# Patient Record
Sex: MALE | Race: WHITE | NOT HISPANIC OR LATINO
[De-identification: names, ages, dates, MRNs, and addresses within clinical notes are randomized per-mention and may not be internally consistent; named-entity substitution may affect disease eponyms.]

---

## 2018-02-18 ENCOUNTER — CHARTING TRANS (OUTPATIENT)
Dept: OTHER | Age: 63
End: 2018-02-18

## 2018-02-18 ENCOUNTER — LAB SERVICES (OUTPATIENT)
Dept: OTHER | Age: 63
End: 2018-02-18

## 2018-02-18 LAB — RAPID STREP GROUP A: NORMAL

## 2018-02-21 LAB — CULTURE STREP GRP A (STTH) HL: NORMAL

## 2018-11-01 VITALS — HEIGHT: 78 IN | BODY MASS INDEX: 27.19 KG/M2 | TEMPERATURE: 99.2 F | OXYGEN SATURATION: 98 % | WEIGHT: 235 LBS

## 2021-08-15 ENCOUNTER — APPOINTMENT (OUTPATIENT)
Dept: ULTRASOUND IMAGING | Age: 66
End: 2021-08-15
Attending: NURSE PRACTITIONER
Payer: COMMERCIAL

## 2021-08-15 ENCOUNTER — HOSPITAL ENCOUNTER (OUTPATIENT)
Age: 66
Discharge: HOME | End: 2021-08-15
Payer: COMMERCIAL

## 2021-08-15 VITALS
SYSTOLIC BLOOD PRESSURE: 120 MMHG | DIASTOLIC BLOOD PRESSURE: 80 MMHG | BODY MASS INDEX: 26.61 KG/M2 | TEMPERATURE: 99 F | HEART RATE: 64 BPM | WEIGHT: 230 LBS | RESPIRATION RATE: 16 BRPM | OXYGEN SATURATION: 98 % | HEIGHT: 78 IN

## 2021-08-15 DIAGNOSIS — M79.89 LEG SWELLING: Primary | ICD-10-CM

## 2021-08-15 PROCEDURE — 99203 OFFICE O/P NEW LOW 30 MIN: CPT | Performed by: NURSE PRACTITIONER

## 2021-08-15 PROCEDURE — 76882 US LMTD JT/FCL EVL NVASC XTR: CPT | Performed by: NURSE PRACTITIONER

## 2021-08-15 RX ORDER — FLUOXETINE HYDROCHLORIDE 40 MG/1
40 CAPSULE ORAL DAILY
COMMUNITY
Start: 2021-04-22

## 2021-08-15 RX ORDER — BUPROPION HYDROCHLORIDE 150 MG/1
150 TABLET ORAL EVERY MORNING
COMMUNITY
Start: 2021-04-22

## 2021-08-15 NOTE — ED PROVIDER NOTES
Patient Seen in: [facility]      History   Patient presents with:  Swelling    Stated Complaint: Leg Skin/Derm problem    HPI/Subjective:   HPI    59-year-old male presents to the immediate care with a swollen area to his right lower leg armen tib-fib   Skin:     General: Skin is warm and dry. Capillary Refill: Capillary refill takes less than 2 seconds. Findings: No bruising, erythema or lesion. Neurological:      General: No focal deficit present. Mental Status: He is alert.

## 2021-08-15 NOTE — ED INITIAL ASSESSMENT (HPI)
Patient is here with swelling in his right lower leg that he noticed three days ago. He denies any pain.

## 2021-09-15 ENCOUNTER — OFFICE VISIT (OUTPATIENT)
Dept: FAMILY MEDICINE CLINIC | Facility: CLINIC | Age: 66
End: 2021-09-15
Payer: COMMERCIAL

## 2021-09-15 VITALS
WEIGHT: 228 LBS | BODY MASS INDEX: 26.38 KG/M2 | DIASTOLIC BLOOD PRESSURE: 84 MMHG | HEART RATE: 66 BPM | TEMPERATURE: 97 F | SYSTOLIC BLOOD PRESSURE: 127 MMHG | HEIGHT: 78 IN

## 2021-09-15 DIAGNOSIS — M79.89 RIGHT LEG SWELLING: Primary | ICD-10-CM

## 2021-09-15 DIAGNOSIS — R22.41 LOWER LEG MASS, RIGHT: ICD-10-CM

## 2021-09-15 PROCEDURE — 3079F DIAST BP 80-89 MM HG: CPT | Performed by: FAMILY MEDICINE

## 2021-09-15 PROCEDURE — 3074F SYST BP LT 130 MM HG: CPT | Performed by: FAMILY MEDICINE

## 2021-09-15 PROCEDURE — 99203 OFFICE O/P NEW LOW 30 MIN: CPT | Performed by: FAMILY MEDICINE

## 2021-09-15 PROCEDURE — 3008F BODY MASS INDEX DOCD: CPT | Performed by: FAMILY MEDICINE

## 2021-09-15 RX ORDER — ALPRAZOLAM 0.25 MG/1
0.25 TABLET ORAL DAILY
COMMUNITY
Start: 2021-09-07

## 2021-09-15 RX ORDER — VALACYCLOVIR HYDROCHLORIDE 1 G/1
TABLET, FILM COATED ORAL
COMMUNITY
Start: 2021-09-07

## 2021-09-15 NOTE — PROGRESS NOTES
HPI:    Patient ID: Flavio Freitas is a 74year old male. HPI  Patient presents with:  Urgent Care F/u: for  lump on right leg feeling well     Patient seen in urgent care on August 15 for leg swelling. Patient is new to the clinic.     Ultrasound:  CON with:  Urgent Care F/u: for  lump on right leg feeling well      Physical Exam  Musculoskeletal:         General: Swelling, deformity and signs of injury present. No tenderness. Right lower leg: No edema. Left lower leg: No edema.       Comments:

## 2021-09-16 ENCOUNTER — HOSPITAL ENCOUNTER (OUTPATIENT)
Dept: GENERAL RADIOLOGY | Age: 66
Discharge: HOME | End: 2021-09-16
Attending: FAMILY MEDICINE
Payer: COMMERCIAL

## 2021-09-16 DIAGNOSIS — R22.41 LOWER LEG MASS, RIGHT: ICD-10-CM

## 2021-09-16 DIAGNOSIS — M79.89 RIGHT LEG SWELLING: ICD-10-CM

## 2021-09-16 PROCEDURE — 73590 X-RAY EXAM OF LOWER LEG: CPT | Performed by: FAMILY MEDICINE

## 2023-10-03 ENCOUNTER — PATIENT OUTREACH (OUTPATIENT)
Dept: FAMILY MEDICINE CLINIC | Facility: CLINIC | Age: 68
End: 2023-10-03